# Patient Record
Sex: FEMALE | Race: WHITE | NOT HISPANIC OR LATINO | Employment: FULL TIME | URBAN - METROPOLITAN AREA
[De-identification: names, ages, dates, MRNs, and addresses within clinical notes are randomized per-mention and may not be internally consistent; named-entity substitution may affect disease eponyms.]

---

## 2024-06-26 ENCOUNTER — OFFICE VISIT (OUTPATIENT)
Dept: OBSTETRICS AND GYNECOLOGY | Age: 44
End: 2024-06-26
Payer: COMMERCIAL

## 2024-06-26 VITALS
DIASTOLIC BLOOD PRESSURE: 70 MMHG | SYSTOLIC BLOOD PRESSURE: 128 MMHG | HEIGHT: 64 IN | BODY MASS INDEX: 21.34 KG/M2 | WEIGHT: 125 LBS

## 2024-06-26 DIAGNOSIS — Z13.9 SPECIAL SCREENING: ICD-10-CM

## 2024-06-26 DIAGNOSIS — Z13.89 SCREENING FOR BLOOD OR PROTEIN IN URINE: ICD-10-CM

## 2024-06-26 DIAGNOSIS — Z85.3 HISTORY OF INVASIVE BREAST CANCER: ICD-10-CM

## 2024-06-26 DIAGNOSIS — Z17.0 MALIGNANT NEOPLASM OF CENTRAL PORTION OF RIGHT BREAST IN FEMALE, ESTROGEN RECEPTOR POSITIVE: ICD-10-CM

## 2024-06-26 DIAGNOSIS — D25.0 SUBMUCOUS UTERINE FIBROID: ICD-10-CM

## 2024-06-26 DIAGNOSIS — C50.111 MALIGNANT NEOPLASM OF CENTRAL PORTION OF RIGHT BREAST IN FEMALE, ESTROGEN RECEPTOR POSITIVE: ICD-10-CM

## 2024-06-26 DIAGNOSIS — N93.9 ABNORMAL UTERINE BLEEDING (AUB): Primary | ICD-10-CM

## 2024-06-26 PROBLEM — C50.919 MALIGNANT NEOPLASM OF BREAST: Status: ACTIVE | Noted: 2020-10-28

## 2024-06-26 PROBLEM — D25.1 INTRAMURAL UTERINE FIBROID: Status: ACTIVE | Noted: 2024-06-26

## 2024-06-26 PROBLEM — C50.919 MALIGNANT NEOPLASM OF BREAST: Status: RESOLVED | Noted: 2020-10-28 | Resolved: 2024-06-26

## 2024-06-26 LAB
B-HCG UR QL: NEGATIVE
BILIRUB BLD-MCNC: NEGATIVE MG/DL
CLARITY, POC: CLEAR
COLOR UR: YELLOW
EXPIRATION DATE: NORMAL
GLUCOSE UR STRIP-MCNC: NEGATIVE MG/DL
INTERNAL NEGATIVE CONTROL: NEGATIVE
INTERNAL POSITIVE CONTROL: NORMAL
KETONES UR QL: NEGATIVE
LEUKOCYTE EST, POC: NEGATIVE
Lab: NORMAL
NITRITE UR-MCNC: NEGATIVE MG/ML
PH UR: 5.5 [PH] (ref 5–8)
PROT UR STRIP-MCNC: NEGATIVE MG/DL
RBC # UR STRIP: NEGATIVE /UL
SP GR UR: 1 (ref 1–1.03)
UROBILINOGEN UR QL: NORMAL

## 2024-06-26 PROCEDURE — 81002 URINALYSIS NONAUTO W/O SCOPE: CPT | Performed by: OBSTETRICS & GYNECOLOGY

## 2024-06-26 PROCEDURE — 99203 OFFICE O/P NEW LOW 30 MIN: CPT | Performed by: OBSTETRICS & GYNECOLOGY

## 2024-06-26 PROCEDURE — 81025 URINE PREGNANCY TEST: CPT | Performed by: OBSTETRICS & GYNECOLOGY

## 2024-06-26 RX ORDER — TAMOXIFEN CITRATE 20 MG/1
1 TABLET ORAL DAILY
COMMUNITY
Start: 2024-06-18

## 2024-06-26 NOTE — PROGRESS NOTES
[facility]   Obstetrics and Gynecology     2024      Patient:  Trudy Smith   MR#:[id number]    Office note    Chief Complaint   Patient presents with    Gynecologic Exam     CC: New irregular periods, had U/S today. Pt states she is not due for pap until after 10/10/24       Subjective     History of Present Illness  44 y.o. female  presents with complaint of abnormal uterine bleeding for the past 6 months with unpredictable irregular periods.  Ultrasound was performed today demonstrating a intramural and submucosal fibroid in the 3 cm range.  Endometrial lining is 10 mm and the patient is on tamoxifen for stage I breast cancer.  Endometrial biopsy is performed today.    Options discussed with the patient for management of her abnormal uterine bleedin including hysteroscopy with D&C versus minimally invasive hysterectomy    Relevant data reviewed:  US Non-ob Transvaginal (2024 10:56)      Objective   Patient Active Problem List   Diagnosis    Malignant neoplasm of central portion of right breast in female, estrogen receptor positive    Intramural uterine fibroid    Fibroids, submucosal       History reviewed. No pertinent past medical history.  Past Surgical History:   Procedure Laterality Date    BREAST LUMPECTOMY Right 2020     Obstetric History:  OB History          0    Para   0    Term   0       0    AB   0    Living   0         SAB   0    IAB   0    Ectopic   0    Molar   0    Multiple   0    Live Births   0               Menstrual History:     Patient's last menstrual period was 2024.       The patient has never been pregnant.  Family History   Problem Relation Age of Onset    Breast cancer Mother     Bone cancer Mother     Ovarian cancer Neg Hx     Uterine cancer Neg Hx     Colon cancer Neg Hx      Social History     Tobacco Use    Smoking status: Never     Passive exposure: Never    Smokeless tobacco: Never   Vaping Use    Vaping status: Never Used  "  Substance Use Topics    Alcohol use: Yes     Comment: occ    Drug use: Never     Sulfamethoxazole-trimethoprim    Current Outpatient Medications:     tamoxifen (NOLVADEX) 20 MG chemo tablet, Take 1 tablet by mouth Daily., Disp: , Rfl:     The following portions of the patient's history were reviewed and updated as appropriate: allergies, current medications, past family history, past medical history, past social history, past surgical history, and problem list.    Review of Systems   Constitutional: Negative.    Respiratory: Negative.     Cardiovascular: Negative.    Gastrointestinal: Negative.    Genitourinary:  Positive for menstrual problem and vaginal bleeding.   Psychiatric/Behavioral: Negative.         BP Readings from Last 3 Encounters:   06/26/24 128/70      Wt Readings from Last 3 Encounters:   06/26/24 56.7 kg (125 lb)      BMI: Estimated body mass index is 21.46 kg/m² as calculated from the following:    Height as of this encounter: 162.6 cm (64\").    Weight as of this encounter: 56.7 kg (125 lb). BSA: Estimated body surface area is 1.6 meters squared as calculated from the following:    Height as of this encounter: 162.6 cm (64\").    Weight as of this encounter: 56.7 kg (125 lb).    Physical Exam  Vitals and nursing note reviewed.   Constitutional:       Appearance: Normal appearance. She is well-developed.   HENT:      Head: Normocephalic and atraumatic.   Neck:      Thyroid: No thyromegaly.      Trachea: No tracheal deviation.   Pulmonary:      Effort: Pulmonary effort is normal.   Chest:      Chest wall: No mass.   Breasts:     Right: No mass, nipple discharge or skin change.      Left: No mass, nipple discharge or skin change.   Abdominal:      General: Bowel sounds are normal.      Palpations: Abdomen is soft. There is no mass.      Tenderness: There is no abdominal tenderness. There is no rebound.      Hernia: No hernia is present. There is no hernia in the left inguinal area.   Genitourinary:    "  Labia:         Right: No rash or lesion.         Left: No rash or lesion.       Vagina: Normal.   Musculoskeletal:      Cervical back: Normal range of motion and neck supple.   Skin:     Findings: No rash.   Neurological:      Mental Status: She is alert and oriented to person, place, and time.      Deep Tendon Reflexes: Reflexes are normal and symmetric.   Psychiatric:         Behavior: Behavior normal.         Thought Content: Thought content normal.         Judgment: Judgment normal.            Assessment & Plan   Diagnoses and all orders for this visit:    1. Abnormal uterine bleeding (AUB) (Primary)  -     Case Request    2. Screening for blood or protein in urine  -     POC Urinalysis Dipstick    3. Special screening  -     POC Pregnancy, Urine    4. Submucous uterine fibroid  -     Case Request    5. History of invasive breast cancer  -     Case Request    6. Malignant neoplasm of central portion of right breast in female, estrogen receptor positive    Other orders  -     Cancel: IGP, Apt HPV,rfx 16 / 18,45  -     Cancel: Mammo Screening Digital Tomosynthesis Bilateral With CAD; Future           Options at length and the patient is leaning towards proceeding with total laparoscopic hysterectomy.    Total laparoscopic hysterectomy with bilateral salpingo-oophorectomy     Minimally invasive approaches to hysterectomy are reviewed with the patient.     The surgical procedure is discussed with the patient in detail.  I discussed the risks of the surgical procedure including, but not limited to the risk of bleeding, infection, and damage to internal organs.  In exceedingly rare cases, death has been reported from surgical complications involving hysterectomy.      It is customary with this procedure to remove both fallopian tubes.  Research has suggested that fallopian tubes may be the source of a type of cancer that was previously attributed to the ovaries.  As well, it is usual practice to conserve the ovaries  "outside of significant pathology.     In cases where extensive scar tissue, fibroids, or uncontrolled bleeding is encountered, it may become necessary to convert the procedure to an “open\" laparotomy hysterectomy involving a longer recovery.     The procedure entails very close operative proximity to the bladder and ureters.  There is a risk of injury to these structures.  It is usual practice to inspect the bladder and insure functioning ureters at the conclusion of the procedure using cystoscopy (camera in the bladder).  In exceptionally straightforward cases, selective cystoscopy may be employed to reduce the incidence of iatrogenic urinary tract infection    In addition to routine postoperative instructions provided, all patients are advised that they MUST avoid vaginal penetration for 6-8 weeks postoperative until the upper vaginal cuff is inspected for proper healing.  There is a rare incidence of vaginal cuff separation that can require emergent intervention.          Return in 1 year (on 6/26/2025) for Annual exam.        Mauricio Alvarez MD   6/26/2024 12:35 EDT  "

## 2024-06-28 PROBLEM — N93.9 ABNORMAL UTERINE BLEEDING (AUB): Status: ACTIVE | Noted: 2024-06-26

## 2024-06-28 PROBLEM — D25.0 SUBMUCOUS UTERINE FIBROID: Status: ACTIVE | Noted: 2024-06-26

## 2024-06-28 PROBLEM — Z85.3 HISTORY OF INVASIVE BREAST CANCER: Status: ACTIVE | Noted: 2024-06-26

## 2024-07-01 LAB
PATH REPORT.FINAL DX SPEC: NORMAL
PATH REPORT.GROSS SPEC: NORMAL
PATH REPORT.SITE OF ORIGIN SPEC: NORMAL
PATHOLOGIST NAME: NORMAL
PAYMENT PROCEDURE: NORMAL

## 2024-07-02 ENCOUNTER — TELEPHONE (OUTPATIENT)
Dept: OBSTETRICS AND GYNECOLOGY | Age: 44
End: 2024-07-02
Payer: COMMERCIAL

## 2024-07-02 NOTE — TELEPHONE ENCOUNTER
I was speaking to Flora about her surgery and she asked about her EMBX results. Can you please review and let me know what to tell her?

## 2024-07-08 ENCOUNTER — OFFICE VISIT (OUTPATIENT)
Dept: OBSTETRICS AND GYNECOLOGY | Age: 44
End: 2024-07-08
Payer: COMMERCIAL

## 2024-07-08 DIAGNOSIS — D25.0 FIBROIDS, SUBMUCOSAL: ICD-10-CM

## 2024-07-08 DIAGNOSIS — Z13.9 SPECIAL SCREENING: ICD-10-CM

## 2024-07-08 DIAGNOSIS — N93.9 ABNORMAL UTERINE BLEEDING (AUB): Primary | ICD-10-CM

## 2024-07-08 DIAGNOSIS — D25.1 INTRAMURAL UTERINE FIBROID: ICD-10-CM

## 2024-07-08 DIAGNOSIS — C50.111 MALIGNANT NEOPLASM OF CENTRAL PORTION OF RIGHT BREAST IN FEMALE, ESTROGEN RECEPTOR POSITIVE: ICD-10-CM

## 2024-07-08 DIAGNOSIS — R89.7 ABNORMAL BIOPSY RESULT: ICD-10-CM

## 2024-07-08 DIAGNOSIS — Z17.0 MALIGNANT NEOPLASM OF CENTRAL PORTION OF RIGHT BREAST IN FEMALE, ESTROGEN RECEPTOR POSITIVE: ICD-10-CM

## 2024-07-08 DIAGNOSIS — D25.0 SUBMUCOUS UTERINE FIBROID: ICD-10-CM

## 2024-07-08 LAB
B-HCG UR QL: NEGATIVE
EXPIRATION DATE: NORMAL
INTERNAL NEGATIVE CONTROL: NEGATIVE
INTERNAL POSITIVE CONTROL: NORMAL
Lab: NORMAL

## 2024-07-08 PROCEDURE — 99213 OFFICE O/P EST LOW 20 MIN: CPT | Performed by: OBSTETRICS & GYNECOLOGY

## 2024-07-08 PROCEDURE — 81025 URINE PREGNANCY TEST: CPT | Performed by: OBSTETRICS & GYNECOLOGY

## 2024-07-08 PROCEDURE — 58100 BIOPSY OF UTERUS LINING: CPT | Performed by: OBSTETRICS & GYNECOLOGY

## 2024-07-08 PROCEDURE — 76998 US GUIDE INTRAOP: CPT | Performed by: OBSTETRICS & GYNECOLOGY

## 2024-07-08 NOTE — ADDENDUM NOTE
Addended by: IZABEL GONZALES on: 7/8/2024 12:06 PM     Modules accepted: Orders     Detail Level: Zone Detail Level: Detailed Detail Level: Simple

## 2024-07-08 NOTE — PROGRESS NOTES
[facility]   Obstetrics and Gynecology     2024      Patient:  Trudy Smith   MR#:[id number]    Office note    No chief complaint on file.      Subjective     History of Present Illness  44 y.o. female  returns for follow-up on nondiagnostic endometrial biopsy with previous lining measuring in excess of 5 mm with a history of tamoxifen therapy.    Repeat biopsy performed under ultrasound guidance with certainty of endometrial sampling.  We discussed the possibility exists that there is just no endometrium to sample because of atrophy.    Relevant data reviewed:       Objective   Patient Active Problem List   Diagnosis    Malignant neoplasm of central portion of right breast in female, estrogen receptor positive    Intramural uterine fibroid    Fibroids, submucosal    Abnormal uterine bleeding (AUB)    Submucous uterine fibroid    History of invasive breast cancer       No past medical history on file.  Past Surgical History:   Procedure Laterality Date    BREAST LUMPECTOMY Right 2020     Obstetric History:  OB History          0    Para   0    Term   0       0    AB   0    Living   0         SAB   0    IAB   0    Ectopic   0    Molar   0    Multiple   0    Live Births   0               Menstrual History:     Patient's last menstrual period was 2024.       The patient has never been pregnant.  Family History   Problem Relation Age of Onset    Breast cancer Mother     Bone cancer Mother     Ovarian cancer Neg Hx     Uterine cancer Neg Hx     Colon cancer Neg Hx      Social History     Tobacco Use    Smoking status: Never     Passive exposure: Never    Smokeless tobacco: Never   Vaping Use    Vaping status: Never Used   Substance Use Topics    Alcohol use: Yes     Comment: occ    Drug use: Never     Sulfamethoxazole-trimethoprim    Current Outpatient Medications:     tamoxifen (NOLVADEX) 20 MG chemo tablet, Take 1 tablet by mouth Daily., Disp: , Rfl:     The following  "portions of the patient's history were reviewed and updated as appropriate: allergies, current medications, past family history, past medical history, past social history, past surgical history, and problem list.    Review of Systems   Constitutional: Negative.    Respiratory: Negative.     Cardiovascular: Negative.    Gastrointestinal: Negative.    Genitourinary: Negative.    Psychiatric/Behavioral: Negative.         BP Readings from Last 3 Encounters:   06/26/24 128/70      Wt Readings from Last 3 Encounters:   06/26/24 56.7 kg (125 lb)      BMI: Estimated body mass index is 21.46 kg/m² as calculated from the following:    Height as of 6/26/24: 162.6 cm (64\").    Weight as of 6/26/24: 56.7 kg (125 lb). BSA: Estimated body surface area is 1.6 meters squared as calculated from the following:    Height as of 6/26/24: 162.6 cm (64\").    Weight as of 6/26/24: 56.7 kg (125 lb).    Physical Exam  Constitutional:       General: She is not in acute distress.  Pulmonary:      Effort: Pulmonary effort is normal.   Abdominal:      Palpations: Abdomen is soft.      Tenderness: There is no abdominal tenderness.   Skin:     General: Skin is warm.   Neurological:      Mental Status: She is alert.   Psychiatric:         Mood and Affect: Mood normal.         Thought Content: Thought content normal.         Judgment: Judgment normal.         Endometrial Biopsy Procedure Note - repeat with ultrasound guidance    Pre-operative Diagnosis: Prior non-diagnostic endometrial sampling    Post-operative Diagnosis: same    Indications: abnormal uterine bleeding    Procedure Details    Urine pregnancy test was done and was NEGATIVE .  The risks (including infection, bleeding, pain, and uterine perforation) and benefits of the procedure were explained to the patient and Verbal informed consent was obtained.  Antibiotic prophylaxis against endocarditis was not indicated.     The patient was placed in the dorsal lithotomy position.  Bimanual exam " showed the uterus to be in the neutral position.  A Graves' speculum inserted in the vagina, and the cervix prepped with povidone iodine.  Endocervical curettage with a Kevorkian curette was not performed.     A sharp tenaculum was applied to the anterior lip of the cervix for stabilization.  A sterile uterine sound was used to sound the uterus to a depth of 6.5cm.  A Pipelle endometrial aspirator was used to sample the endometrium.  Sample was sent for pathologic examination.    Three passes were made with the Pipelle with ultrasound confirmation of cavity placement and advancement to the fundus.  Again scant tissue was recovered with the 3 passes    Condition:  Stable    Complications:  None  Patient tolerated the procedure well without complications.    Plan:    The patient was advised to call for any fever or for prolonged or severe pain or bleeding. She was advised to use NSAID as needed for mild to moderate pain. She was advised to avoid vaginal intercourse for 48 hours or until the bleeding has completely stopped.    Attending Physician Documentation:  I was present for the entire procedure.        Assessment & Plan   Diagnoses and all orders for this visit:    1. Abnormal uterine bleeding (AUB) (Primary)    2. Special screening  -     POC Pregnancy, Urine    3. Fibroids, submucosal    4. Intramural uterine fibroid    5. Malignant neoplasm of central portion of right breast in female, estrogen receptor positive    6. Submucous uterine fibroid    7. Abnormal biopsy result - non-diagnostic [R89.7]      Endometrial sampling repeated with ultrasound guidance and thorough biopsy of the complete cavity was observed         No follow-ups on file.        Mauricio Alvarez MD   7/8/2024 11:05 EDT

## 2024-08-14 RX ORDER — SODIUM CHLORIDE 9 MG/ML
40 INJECTION, SOLUTION INTRAVENOUS AS NEEDED
Status: DISCONTINUED | OUTPATIENT
Start: 2024-08-14 | End: 2024-08-29

## 2024-08-20 ENCOUNTER — TELEPHONE (OUTPATIENT)
Dept: OBSTETRICS AND GYNECOLOGY | Age: 44
End: 2024-08-20
Payer: COMMERCIAL

## 2024-08-20 NOTE — TELEPHONE ENCOUNTER
I called pt back and had to leave a message. I left her a detailed message and instructed her to call me with any questions.

## 2024-08-20 NOTE — TELEPHONE ENCOUNTER
Pt called and states that she was given Macrobid for a UTI yesterday. She took it and while at work last night had an allergic reaction. Anaphylaxis  She was taken by ambulance to [facility] and given 2 Epi Pens. She had extensive lab work done at the hospital. PAT spoke to pt and said that she did not need lab work, but wanted her to call us and see if you wanted her to have it drawn on Monday with her PCP. Pt is also taking Tamoxifen and she was instructed to ask you if she needs to stop it prior to surgery or just morning of. All ER records are in her chart. Can you please advise?

## 2024-08-28 NOTE — H&P
[facility]   Obstetrics and Gynecology   History & Physical    2024    Patient: Trudy Smith          MR#:[id number]    Chief complaint:  Abnormal bleeding with Tamoxifen therapy     Subjective     44 y.o. female  with complaint of abnormal uterine bleeding for the past 6 months with unpredictable irregular periods.  Ultrasound was performed today demonstrating a intramural and submucosal fibroid in the 3 cm range.  Endometrial lining is 10 mm and the patient is on tamoxifen for stage I breast cancer.  Endometrial biopsy is performed today.     Options discussed with the patient for management of her abnormal uterine bleedin including hysteroscopy with D&C versus minimally invasive hysterectomy     Relevant data reviewed:  US Non-ob Transvaginal (2024 10:56)   Reference Histopathology (2024 13:25)        Patient Active Problem List   Diagnosis    Malignant neoplasm of central portion of right breast in female, estrogen receptor positive    Intramural uterine fibroid    Fibroids, submucosal    Abnormal uterine bleeding (AUB)    Submucous uterine fibroid    History of invasive breast cancer       Past Medical History:   Diagnosis Date    Fibroid uterus     History of breast cancer         History of radiation therapy     UTI (urinary tract infection)     CURRENTLY BEING TREATED BY PCP 2024       Past Surgical History:   Procedure Laterality Date    BREAST LUMPECTOMY Right 2020    WITH LYMPH NODE    COLONOSCOPY      WISDOM TOOTH EXTRACTION         Obstetric History:  OB History          0    Para   0    Term   0       0    AB   0    Living   0         SAB   0    IAB   0    Ectopic   0    Molar   0    Multiple   0    Live Births   0               Menstrual History:     Patient's last menstrual period was 2024.       The patient has never been pregnant.    Family History   Problem Relation Age of Onset    Breast cancer Mother     Bone cancer  Mother     Ovarian cancer Neg Hx     Uterine cancer Neg Hx     Colon cancer Neg Hx     Malig Hyperthermia Neg Hx        Social History     Tobacco Use    Smoking status: Never     Passive exposure: Never    Smokeless tobacco: Never   Vaping Use    Vaping status: Never Used   Substance Use Topics    Alcohol use: Yes     Comment: occ    Drug use: Never       Nitrofurantoin and Sulfamethoxazole-trimethoprim      Current Facility-Administered Medications:     ceFAZolin 2000 mg IVPB in 100 mL NS (MBP), 2 g, Intravenous, Once, Mauricio Alvarez MD    fentaNYL citrate (PF) (SUBLIMAZE) injection 50 mcg, 50 mcg, Intravenous, Once PRN, Ricardo Ferrell MD    lactated ringers infusion, 9 mL/hr, Intravenous, Continuous, Ricardo Ferrell MD, Last Rate: 9 mL/hr at 08/29/24 0848, 9 mL/hr at 08/29/24 0848    lidocaine (XYLOCAINE) 1 % injection 0.5 mL, 0.5 mL, Intradermal, Once PRN, Ricardo Ferrell MD    midazolam (VERSED) injection 1 mg, 1 mg, Intravenous, Q5 Min PRN, Ricardo Ferrell MD    phenazopyridine (PYRIDIUM) tablet 200 mg, 200 mg, Oral, Once, Mauricio Alvarez MD, 200 mg at 08/29/24 0847    scopolamine patch 1 mg/72 hr, 1 patch, Transdermal, Continuous, Mauricio Alvarez MD, 1 patch at 08/29/24 0848    sodium chloride 0.9 % flush 10 mL, 10 mL, Intravenous, PRN, Mauricio Alvarez MD    sodium chloride 0.9 % flush 3 mL, 3 mL, Intravenous, Q12H, Mauricio Alvarez MD    sodium chloride 0.9 % flush 3 mL, 3 mL, Intravenous, Q12H, Ricardo Ferrell MD    sodium chloride 0.9 % flush 3-10 mL, 3-10 mL, Intravenous, PRN, Ricardo Ferrell MD    sodium chloride 0.9 % infusion 40 mL, 40 mL, Intravenous, PRN, Mauricio Alvarez MD    Review of Systems  Review of Systems   Constitutional: Negative.    Respiratory: Negative.     Cardiovascular: Negative.    Gastrointestinal: Negative.    Genitourinary:  Positive for menstrual problem and vaginal bleeding.   Psychiatric/Behavioral: Negative.         Objective     Vital Signs  Temp:  [98 °F (36.7  "°C)] 98 °F (36.7 °C)  Heart Rate:  [71] 71  Resp:  [18] 18  BP: (115)/(78) 115/78    Physical Exam:  Physical Exam  Vitals and nursing note reviewed.   Constitutional:       Appearance: Normal appearance. She is well-developed.   HENT:      Head: Normocephalic and atraumatic.   Neck:      Thyroid: No thyromegaly.      Trachea: No tracheal deviation.   Pulmonary:      Effort: Pulmonary effort is normal.   Chest:      Chest wall: No mass.   Breasts:     Right: No mass, nipple discharge or skin change.      Left: No mass, nipple discharge or skin change.   Abdominal:      General: Bowel sounds are normal.      Palpations: Abdomen is soft. There is no mass.      Tenderness: There is no abdominal tenderness. There is no rebound.      Hernia: No hernia is present. There is no hernia in the left inguinal area.   Genitourinary:     Labia:         Right: No rash or lesion.         Left: No rash or lesion.       Vagina: Normal.   Musculoskeletal:      Cervical back: Normal range of motion and neck supple.   Skin:     Findings: No rash.   Neurological:      Mental Status: She is alert and oriented to person, place, and time.      Deep Tendon Reflexes: Reflexes are normal and symmetric.   Psychiatric:         Behavior: Behavior normal.         Thought Content: Thought content normal.         Judgment: Judgment normal.         Labs:            Invalid input(s): \"LABALBU\", \"PROT\"      Assessment & Plan       Abnormal uterine bleeding (AUB)    Submucous uterine fibroid    History of invasive breast cancer        Plan:  Total laparoscopic hysterectomy with bilateral salpingo-oophorectomy     Minimally invasive approaches to hysterectomy are reviewed with the patient.     The surgical procedure is discussed with the patient in detail.  I discussed the risks of the surgical procedure including, but not limited to the risk of bleeding, infection, and damage to internal organs.  In exceedingly rare cases, death has been reported from " "surgical complications involving hysterectomy.      It is customary with this procedure to remove both fallopian tubes.  Research has suggested that fallopian tubes may be the source of a type of cancer that was previously attributed to the ovaries.  As well, it is usual practice to conserve the ovaries outside of significant pathology.     In cases where extensive scar tissue, fibroids, or uncontrolled bleeding is encountered, it may become necessary to convert the procedure to an “open\" laparotomy hysterectomy involving a longer recovery.     The procedure entails very close operative proximity to the bladder and ureters.  There is a risk of injury to these structures.  It is usual practice to inspect the bladder and insure functioning ureters at the conclusion of the procedure using cystoscopy (camera in the bladder).  In exceptionally straightforward cases, selective cystoscopy may be employed to reduce the incidence of iatrogenic urinary tract infection    In addition to routine postoperative instructions provided, all patients are advised that they MUST avoid vaginal penetration for 6-8 weeks postoperative until the upper vaginal cuff is inspected for proper healing.  There is a rare incidence of vaginal cuff separation that can require emergent intervention.      Mauricio Alvarez MD  08/29/24  09:15 EDT      Patient Care Team:  Mary Lou Rodriguez PA as PCP - General (Physician Assistant)            "

## 2024-08-29 ENCOUNTER — HOSPITAL ENCOUNTER (OUTPATIENT)
Facility: HOSPITAL | Age: 44
Discharge: HOME | End: 2024-08-29
Attending: OBSTETRICS & GYNECOLOGY | Admitting: OBSTETRICS & GYNECOLOGY
Payer: COMMERCIAL

## 2024-08-29 ENCOUNTER — ANESTHESIA (OUTPATIENT)
Dept: PERIOP | Facility: HOSPITAL | Age: 44
End: 2024-08-29
Payer: COMMERCIAL

## 2024-08-29 ENCOUNTER — ANESTHESIA EVENT (OUTPATIENT)
Dept: PERIOP | Facility: HOSPITAL | Age: 44
End: 2024-08-29
Payer: COMMERCIAL

## 2024-08-29 VITALS
TEMPERATURE: 97.2 F | HEIGHT: 64 IN | SYSTOLIC BLOOD PRESSURE: 103 MMHG | OXYGEN SATURATION: 97 % | DIASTOLIC BLOOD PRESSURE: 68 MMHG | HEART RATE: 72 BPM | BODY MASS INDEX: 21.51 KG/M2 | RESPIRATION RATE: 16 BRPM

## 2024-08-29 DIAGNOSIS — D25.0 SUBMUCOUS UTERINE FIBROID: ICD-10-CM

## 2024-08-29 DIAGNOSIS — Z90.710 S/P LAPAROSCOPIC HYSTERECTOMY: ICD-10-CM

## 2024-08-29 DIAGNOSIS — Z85.3 HISTORY OF INVASIVE BREAST CANCER: ICD-10-CM

## 2024-08-29 DIAGNOSIS — N93.9 ABNORMAL UTERINE BLEEDING (AUB): ICD-10-CM

## 2024-08-29 DIAGNOSIS — Z01.818 PREOP TESTING: Primary | ICD-10-CM

## 2024-08-29 LAB
ABO GROUP BLD: NORMAL
B-HCG UR QL: NEGATIVE
BLD GP AB SCN SERPL QL: NEGATIVE
EXPIRATION DATE: NORMAL
INTERNAL NEGATIVE CONTROL: NORMAL
INTERNAL POSITIVE CONTROL: NORMAL
Lab: NORMAL
RH BLD: POSITIVE
T&S EXPIRATION DATE: NORMAL

## 2024-08-29 PROCEDURE — 25010000002 HYDROMORPHONE 1 MG/ML SOLUTION: Performed by: REGISTERED NURSE

## 2024-08-29 PROCEDURE — 86900 BLOOD TYPING SEROLOGIC ABO: CPT | Performed by: OBSTETRICS & GYNECOLOGY

## 2024-08-29 PROCEDURE — 25010000002 SUGAMMADEX 200 MG/2ML SOLUTION: Performed by: REGISTERED NURSE

## 2024-08-29 PROCEDURE — 25010000002 KETOROLAC TROMETHAMINE PER 15 MG: Performed by: OBSTETRICS & GYNECOLOGY

## 2024-08-29 PROCEDURE — 86850 RBC ANTIBODY SCREEN: CPT | Performed by: OBSTETRICS & GYNECOLOGY

## 2024-08-29 PROCEDURE — 25010000002 DEXAMETHASONE SODIUM PHOSPHATE 20 MG/5ML SOLUTION: Performed by: REGISTERED NURSE

## 2024-08-29 PROCEDURE — 58571 TLH W/T/O 250 G OR LESS: CPT | Performed by: OBSTETRICS & GYNECOLOGY

## 2024-08-29 PROCEDURE — 25810000003 SODIUM CHLORIDE PER 500 ML: Performed by: OBSTETRICS & GYNECOLOGY

## 2024-08-29 PROCEDURE — 25010000002 POTASSIUM CHLORIDE PER 2 MEQ: Performed by: OBSTETRICS & GYNECOLOGY

## 2024-08-29 PROCEDURE — 81025 URINE PREGNANCY TEST: CPT | Performed by: ANESTHESIOLOGY

## 2024-08-29 PROCEDURE — 86901 BLOOD TYPING SEROLOGIC RH(D): CPT | Performed by: OBSTETRICS & GYNECOLOGY

## 2024-08-29 PROCEDURE — 88307 TISSUE EXAM BY PATHOLOGIST: CPT | Performed by: OBSTETRICS & GYNECOLOGY

## 2024-08-29 PROCEDURE — 25010000002 CEFAZOLIN PER 500 MG: Performed by: OBSTETRICS & GYNECOLOGY

## 2024-08-29 PROCEDURE — 25010000002 PROPOFOL 200 MG/20ML EMULSION: Performed by: REGISTERED NURSE

## 2024-08-29 PROCEDURE — 25010000002 ONDANSETRON PER 1 MG: Performed by: REGISTERED NURSE

## 2024-08-29 PROCEDURE — 63710000001 ONDANSETRON ODT 4 MG TABLET DISPERSIBLE: Performed by: OBSTETRICS & GYNECOLOGY

## 2024-08-29 PROCEDURE — 25810000003 LACTATED RINGERS PER 1000 ML: Performed by: ANESTHESIOLOGY

## 2024-08-29 DEVICE — ABSORBABLE RELOAD
Type: IMPLANTABLE DEVICE | Site: VAGINA | Status: FUNCTIONAL
Brand: V-LOC 180

## 2024-08-29 RX ORDER — SCOLOPAMINE TRANSDERMAL SYSTEM 1 MG/1
1 PATCH, EXTENDED RELEASE TRANSDERMAL CONTINUOUS
Status: DISCONTINUED | OUTPATIENT
Start: 2024-08-29 | End: 2024-08-29

## 2024-08-29 RX ORDER — SODIUM CHLORIDE 0.9 % (FLUSH) 0.9 %
3 SYRINGE (ML) INJECTION EVERY 12 HOURS SCHEDULED
Status: DISCONTINUED | OUTPATIENT
Start: 2024-08-29 | End: 2024-08-29 | Stop reason: HOSPADM

## 2024-08-29 RX ORDER — BISACODYL 5 MG/1
10 TABLET, DELAYED RELEASE ORAL DAILY PRN
Status: DISCONTINUED | OUTPATIENT
Start: 2024-08-29 | End: 2024-08-29 | Stop reason: HOSPADM

## 2024-08-29 RX ORDER — HYDROMORPHONE HYDROCHLORIDE 1 MG/ML
0.5 INJECTION, SOLUTION INTRAMUSCULAR; INTRAVENOUS; SUBCUTANEOUS
Status: DISCONTINUED | OUTPATIENT
Start: 2024-08-29 | End: 2024-08-29 | Stop reason: HOSPADM

## 2024-08-29 RX ORDER — IPRATROPIUM BROMIDE AND ALBUTEROL SULFATE 2.5; .5 MG/3ML; MG/3ML
3 SOLUTION RESPIRATORY (INHALATION) ONCE AS NEEDED
Status: DISCONTINUED | OUTPATIENT
Start: 2024-08-29 | End: 2024-08-29 | Stop reason: HOSPADM

## 2024-08-29 RX ORDER — PHENYLEPHRINE HCL IN 0.9% NACL 1 MG/10 ML
SYRINGE (ML) INTRAVENOUS AS NEEDED
Status: DISCONTINUED | OUTPATIENT
Start: 2024-08-29 | End: 2024-08-29 | Stop reason: SURG

## 2024-08-29 RX ORDER — OXYCODONE AND ACETAMINOPHEN 5; 325 MG/1; MG/1
1 TABLET ORAL EVERY 4 HOURS PRN
Status: DISCONTINUED | OUTPATIENT
Start: 2024-08-29 | End: 2024-08-29 | Stop reason: HOSPADM

## 2024-08-29 RX ORDER — HYDRALAZINE HYDROCHLORIDE 20 MG/ML
5 INJECTION INTRAMUSCULAR; INTRAVENOUS
Status: DISCONTINUED | OUTPATIENT
Start: 2024-08-29 | End: 2024-08-29 | Stop reason: HOSPADM

## 2024-08-29 RX ORDER — ONDANSETRON 2 MG/ML
4 INJECTION INTRAMUSCULAR; INTRAVENOUS ONCE AS NEEDED
Status: DISCONTINUED | OUTPATIENT
Start: 2024-08-29 | End: 2024-08-29 | Stop reason: HOSPADM

## 2024-08-29 RX ORDER — BUPIVACAINE HYDROCHLORIDE AND EPINEPHRINE 5; 5 MG/ML; UG/ML
INJECTION, SOLUTION EPIDURAL; INTRACAUDAL; PERINEURAL AS NEEDED
Status: DISCONTINUED | OUTPATIENT
Start: 2024-08-29 | End: 2024-08-29 | Stop reason: HOSPADM

## 2024-08-29 RX ORDER — PROMETHAZINE HYDROCHLORIDE 25 MG/1
25 SUPPOSITORY RECTAL ONCE AS NEEDED
Status: DISCONTINUED | OUTPATIENT
Start: 2024-08-29 | End: 2024-08-29 | Stop reason: HOSPADM

## 2024-08-29 RX ORDER — DEXAMETHASONE SODIUM PHOSPHATE 4 MG/ML
INJECTION, SOLUTION INTRA-ARTICULAR; INTRALESIONAL; INTRAMUSCULAR; INTRAVENOUS; SOFT TISSUE AS NEEDED
Status: DISCONTINUED | OUTPATIENT
Start: 2024-08-29 | End: 2024-08-29 | Stop reason: SURG

## 2024-08-29 RX ORDER — DROPERIDOL 2.5 MG/ML
0.62 INJECTION, SOLUTION INTRAMUSCULAR; INTRAVENOUS
Status: DISCONTINUED | OUTPATIENT
Start: 2024-08-29 | End: 2024-08-29 | Stop reason: HOSPADM

## 2024-08-29 RX ORDER — DIPHENHYDRAMINE HYDROCHLORIDE 50 MG/ML
12.5 INJECTION INTRAMUSCULAR; INTRAVENOUS
Status: DISCONTINUED | OUTPATIENT
Start: 2024-08-29 | End: 2024-08-29 | Stop reason: HOSPADM

## 2024-08-29 RX ORDER — FLUMAZENIL 0.1 MG/ML
0.2 INJECTION INTRAVENOUS AS NEEDED
Status: DISCONTINUED | OUTPATIENT
Start: 2024-08-29 | End: 2024-08-29 | Stop reason: HOSPADM

## 2024-08-29 RX ORDER — ONDANSETRON 2 MG/ML
INJECTION INTRAMUSCULAR; INTRAVENOUS AS NEEDED
Status: DISCONTINUED | OUTPATIENT
Start: 2024-08-29 | End: 2024-08-29 | Stop reason: SURG

## 2024-08-29 RX ORDER — KETOROLAC TROMETHAMINE 15 MG/ML
15 INJECTION, SOLUTION INTRAMUSCULAR; INTRAVENOUS EVERY 6 HOURS
Status: DISCONTINUED | OUTPATIENT
Start: 2024-08-29 | End: 2024-08-29 | Stop reason: HOSPADM

## 2024-08-29 RX ORDER — EPHEDRINE SULFATE 50 MG/ML
5 INJECTION, SOLUTION INTRAVENOUS ONCE AS NEEDED
Status: DISCONTINUED | OUTPATIENT
Start: 2024-08-29 | End: 2024-08-29 | Stop reason: HOSPADM

## 2024-08-29 RX ORDER — SODIUM CHLORIDE 0.9 % (FLUSH) 0.9 %
10 SYRINGE (ML) INJECTION AS NEEDED
Status: DISCONTINUED | OUTPATIENT
Start: 2024-08-29 | End: 2024-08-29 | Stop reason: HOSPADM

## 2024-08-29 RX ORDER — IBUPROFEN 600 MG/1
600 TABLET, FILM COATED ORAL EVERY 6 HOURS PRN
Qty: 30 TABLET | Refills: 1 | Status: SHIPPED | OUTPATIENT
Start: 2024-08-29

## 2024-08-29 RX ORDER — LABETALOL HYDROCHLORIDE 5 MG/ML
5 INJECTION, SOLUTION INTRAVENOUS
Status: DISCONTINUED | OUTPATIENT
Start: 2024-08-29 | End: 2024-08-29 | Stop reason: HOSPADM

## 2024-08-29 RX ORDER — ALUMINA, MAGNESIA, AND SIMETHICONE 2400; 2400; 240 MG/30ML; MG/30ML; MG/30ML
15 SUSPENSION ORAL EVERY 6 HOURS
Status: DISCONTINUED | OUTPATIENT
Start: 2024-08-29 | End: 2024-08-29 | Stop reason: HOSPADM

## 2024-08-29 RX ORDER — LIDOCAINE HYDROCHLORIDE 20 MG/ML
INJECTION, SOLUTION INFILTRATION; PERINEURAL AS NEEDED
Status: DISCONTINUED | OUTPATIENT
Start: 2024-08-29 | End: 2024-08-29 | Stop reason: SURG

## 2024-08-29 RX ORDER — ACETAMINOPHEN 500 MG
1000 TABLET ORAL ONCE
Status: COMPLETED | OUTPATIENT
Start: 2024-08-29 | End: 2024-08-29

## 2024-08-29 RX ORDER — LIDOCAINE HYDROCHLORIDE 10 MG/ML
0.5 INJECTION, SOLUTION INFILTRATION; PERINEURAL ONCE AS NEEDED
Status: DISCONTINUED | OUTPATIENT
Start: 2024-08-29 | End: 2024-08-29 | Stop reason: HOSPADM

## 2024-08-29 RX ORDER — FENTANYL CITRATE 50 UG/ML
50 INJECTION, SOLUTION INTRAMUSCULAR; INTRAVENOUS ONCE AS NEEDED
Status: DISCONTINUED | OUTPATIENT
Start: 2024-08-29 | End: 2024-08-29 | Stop reason: HOSPADM

## 2024-08-29 RX ORDER — HYDROCODONE BITARTRATE AND ACETAMINOPHEN 5; 325 MG/1; MG/1
1 TABLET ORAL EVERY 6 HOURS PRN
Qty: 14 TABLET | Refills: 0 | Status: SHIPPED | OUTPATIENT
Start: 2024-08-29

## 2024-08-29 RX ORDER — BISACODYL 10 MG
10 SUPPOSITORY, RECTAL RECTAL DAILY PRN
Status: DISCONTINUED | OUTPATIENT
Start: 2024-08-29 | End: 2024-08-29 | Stop reason: HOSPADM

## 2024-08-29 RX ORDER — SIMETHICONE 80 MG
80 TABLET,CHEWABLE ORAL 4 TIMES DAILY PRN
Status: DISCONTINUED | OUTPATIENT
Start: 2024-08-29 | End: 2024-08-29 | Stop reason: HOSPADM

## 2024-08-29 RX ORDER — SODIUM CHLORIDE 0.9 % (FLUSH) 0.9 %
3-10 SYRINGE (ML) INJECTION AS NEEDED
Status: DISCONTINUED | OUTPATIENT
Start: 2024-08-29 | End: 2024-08-29 | Stop reason: HOSPADM

## 2024-08-29 RX ORDER — PHENAZOPYRIDINE HYDROCHLORIDE 200 MG/1
200 TABLET, FILM COATED ORAL ONCE
Status: DISCONTINUED | OUTPATIENT
Start: 2024-08-29 | End: 2024-08-29

## 2024-08-29 RX ORDER — ONDANSETRON 2 MG/ML
4 INJECTION INTRAMUSCULAR; INTRAVENOUS EVERY 6 HOURS PRN
Status: DISCONTINUED | OUTPATIENT
Start: 2024-08-29 | End: 2024-08-29 | Stop reason: HOSPADM

## 2024-08-29 RX ORDER — ONDANSETRON 4 MG/1
4 TABLET, ORALLY DISINTEGRATING ORAL EVERY 6 HOURS PRN
Status: DISCONTINUED | OUTPATIENT
Start: 2024-08-29 | End: 2024-08-29 | Stop reason: HOSPADM

## 2024-08-29 RX ORDER — MIDAZOLAM HYDROCHLORIDE 1 MG/ML
1 INJECTION INTRAMUSCULAR; INTRAVENOUS
Status: DISCONTINUED | OUTPATIENT
Start: 2024-08-29 | End: 2024-08-29 | Stop reason: HOSPADM

## 2024-08-29 RX ORDER — FENTANYL CITRATE 50 UG/ML
50 INJECTION, SOLUTION INTRAMUSCULAR; INTRAVENOUS
Status: DISCONTINUED | OUTPATIENT
Start: 2024-08-29 | End: 2024-08-29 | Stop reason: HOSPADM

## 2024-08-29 RX ORDER — ROCURONIUM BROMIDE 10 MG/ML
INJECTION, SOLUTION INTRAVENOUS AS NEEDED
Status: DISCONTINUED | OUTPATIENT
Start: 2024-08-29 | End: 2024-08-29 | Stop reason: SURG

## 2024-08-29 RX ORDER — NALOXONE HCL 0.4 MG/ML
0.1 VIAL (ML) INJECTION
Status: DISCONTINUED | OUTPATIENT
Start: 2024-08-29 | End: 2024-08-29 | Stop reason: HOSPADM

## 2024-08-29 RX ORDER — PROMETHAZINE HYDROCHLORIDE 25 MG/1
25 TABLET ORAL ONCE AS NEEDED
Status: DISCONTINUED | OUTPATIENT
Start: 2024-08-29 | End: 2024-08-29 | Stop reason: HOSPADM

## 2024-08-29 RX ORDER — DOCUSATE SODIUM 100 MG/1
100 CAPSULE, LIQUID FILLED ORAL 2 TIMES DAILY PRN
Status: DISCONTINUED | OUTPATIENT
Start: 2024-08-29 | End: 2024-08-29 | Stop reason: HOSPADM

## 2024-08-29 RX ORDER — OXYCODONE AND ACETAMINOPHEN 7.5; 325 MG/1; MG/1
1 TABLET ORAL EVERY 4 HOURS PRN
Status: DISCONTINUED | OUTPATIENT
Start: 2024-08-29 | End: 2024-08-29 | Stop reason: HOSPADM

## 2024-08-29 RX ORDER — FAMOTIDINE 10 MG/ML
20 INJECTION, SOLUTION INTRAVENOUS ONCE
Status: COMPLETED | OUTPATIENT
Start: 2024-08-29 | End: 2024-08-29

## 2024-08-29 RX ORDER — GABAPENTIN 300 MG/1
600 CAPSULE ORAL ONCE
Status: COMPLETED | OUTPATIENT
Start: 2024-08-29 | End: 2024-08-29

## 2024-08-29 RX ORDER — SODIUM CHLORIDE 9 MG/ML
INJECTION, SOLUTION INTRAVENOUS AS NEEDED
Status: DISCONTINUED | OUTPATIENT
Start: 2024-08-29 | End: 2024-08-29 | Stop reason: HOSPADM

## 2024-08-29 RX ORDER — NALOXONE HCL 0.4 MG/ML
0.2 VIAL (ML) INJECTION AS NEEDED
Status: DISCONTINUED | OUTPATIENT
Start: 2024-08-29 | End: 2024-08-29 | Stop reason: HOSPADM

## 2024-08-29 RX ORDER — HYDROCODONE BITARTRATE AND ACETAMINOPHEN 5; 325 MG/1; MG/1
1 TABLET ORAL ONCE AS NEEDED
Status: DISCONTINUED | OUTPATIENT
Start: 2024-08-29 | End: 2024-08-29 | Stop reason: HOSPADM

## 2024-08-29 RX ORDER — SODIUM CHLORIDE AND POTASSIUM CHLORIDE 150; 450 MG/100ML; MG/100ML
100 INJECTION, SOLUTION INTRAVENOUS CONTINUOUS
Status: DISCONTINUED | OUTPATIENT
Start: 2024-08-29 | End: 2024-08-29 | Stop reason: HOSPADM

## 2024-08-29 RX ORDER — SODIUM CHLORIDE, SODIUM LACTATE, POTASSIUM CHLORIDE, CALCIUM CHLORIDE 600; 310; 30; 20 MG/100ML; MG/100ML; MG/100ML; MG/100ML
9 INJECTION, SOLUTION INTRAVENOUS CONTINUOUS
Status: DISCONTINUED | OUTPATIENT
Start: 2024-08-29 | End: 2024-08-29

## 2024-08-29 RX ORDER — PROPOFOL 10 MG/ML
INJECTION, EMULSION INTRAVENOUS AS NEEDED
Status: DISCONTINUED | OUTPATIENT
Start: 2024-08-29 | End: 2024-08-29 | Stop reason: SURG

## 2024-08-29 RX ADMIN — POTASSIUM CHLORIDE AND SODIUM CHLORIDE 100 ML/HR: 450; 150 INJECTION, SOLUTION INTRAVENOUS at 15:25

## 2024-08-29 RX ADMIN — ONDANSETRON 4 MG: 2 INJECTION INTRAMUSCULAR; INTRAVENOUS at 10:06

## 2024-08-29 RX ADMIN — SCOPALAMINE 1 PATCH: 1 PATCH, EXTENDED RELEASE TRANSDERMAL at 08:48

## 2024-08-29 RX ADMIN — KETOROLAC TROMETHAMINE 15 MG: 15 INJECTION, SOLUTION INTRAMUSCULAR; INTRAVENOUS at 13:57

## 2024-08-29 RX ADMIN — LIDOCAINE HYDROCHLORIDE 60 MG: 20 INJECTION, SOLUTION INFILTRATION; PERINEURAL at 10:03

## 2024-08-29 RX ADMIN — GABAPENTIN 600 MG: 300 CAPSULE ORAL at 08:48

## 2024-08-29 RX ADMIN — ACETAMINOPHEN 1000 MG: 500 TABLET ORAL at 08:48

## 2024-08-29 RX ADMIN — Medication 100 MCG: at 10:16

## 2024-08-29 RX ADMIN — DEXAMETHASONE SODIUM PHOSPHATE 6 MG: 4 INJECTION, SOLUTION INTRAMUSCULAR; INTRAVENOUS at 10:06

## 2024-08-29 RX ADMIN — SODIUM CHLORIDE, POTASSIUM CHLORIDE, SODIUM LACTATE AND CALCIUM CHLORIDE 9 ML/HR: 600; 310; 30; 20 INJECTION, SOLUTION INTRAVENOUS at 08:48

## 2024-08-29 RX ADMIN — Medication 100 MCG: at 11:05

## 2024-08-29 RX ADMIN — FAMOTIDINE 20 MG: 10 INJECTION INTRAVENOUS at 08:47

## 2024-08-29 RX ADMIN — ONDANSETRON 4 MG: 4 TABLET, ORALLY DISINTEGRATING ORAL at 18:06

## 2024-08-29 RX ADMIN — Medication 100 MCG: at 11:10

## 2024-08-29 RX ADMIN — SODIUM CHLORIDE 2 G: 900 INJECTION INTRAVENOUS at 09:49

## 2024-08-29 RX ADMIN — PHENAZOPYRIDINE 200 MG: 200 TABLET ORAL at 08:47

## 2024-08-29 RX ADMIN — OXYCODONE HYDROCHLORIDE AND ACETAMINOPHEN 1 TABLET: 5; 325 TABLET ORAL at 18:06

## 2024-08-29 RX ADMIN — ROCURONIUM BROMIDE 50 MG: 10 INJECTION, SOLUTION INTRAVENOUS at 10:05

## 2024-08-29 RX ADMIN — SUGAMMADEX 100 MG: 100 INJECTION, SOLUTION INTRAVENOUS at 11:10

## 2024-08-29 RX ADMIN — ALUMINUM HYDROXIDE, MAGNESIUM HYDROXIDE, DIMETHICONE 15 ML: 400; 400; 40 SUSPENSION ORAL at 13:57

## 2024-08-29 RX ADMIN — HYDROMORPHONE HYDROCHLORIDE 0.5 MG: 1 INJECTION, SOLUTION INTRAMUSCULAR; INTRAVENOUS; SUBCUTANEOUS at 10:02

## 2024-08-29 RX ADMIN — PROPOFOL 100 MG: 10 INJECTION, EMULSION INTRAVENOUS at 10:03

## 2024-08-29 RX ADMIN — DOCUSATE SODIUM 100 MG: 100 CAPSULE, LIQUID FILLED ORAL at 18:06

## 2024-08-29 NOTE — ANESTHESIA POSTPROCEDURE EVALUATION
Patient: Trudy Smith    Procedure Summary       Date: 08/29/24 Room / Location: [facility] OR [address] [place] [address] MAIN OR    Anesthesia Start: 0954 Anesthesia Stop: 1129    Procedure: TOTAL LAPAROSCOPIC HYSTERECTOMY BILATERAL SALPINGO OOPHORECTOMY, CYSTOSCOPY (Abdomen) Diagnosis:       Abnormal uterine bleeding (AUB)      Submucous uterine fibroid      History of invasive breast cancer      (Abnormal uterine bleeding (AUB) [N93.9])      (Submucous uterine fibroid [D25.0])      (History of invasive breast cancer [Z85.3])    Surgeons: Mauricio Alvarez MD Provider: Ricardo Ferrell MD    Anesthesia Type: general ASA Status: 2            Anesthesia Type: general    Vitals  Vitals Value Taken Time   BP 99/71 08/29/24 1215   Temp 36.6 °C (97.8 °F) 08/29/24 1127   Pulse 68 08/29/24 1221   Resp 16 08/29/24 1215   SpO2 100 % 08/29/24 1221   Vitals shown include unfiled device data.        Post Anesthesia Care and Evaluation    Patient location during evaluation: PACU  Patient participation: complete - patient participated  Level of consciousness: awake and alert  Pain management: adequate    Airway patency: patent  Anesthetic complications: No anesthetic complications  PONV Status: none  Cardiovascular status: acceptable and hemodynamically stable  Respiratory status: acceptable, nonlabored ventilation and spontaneous ventilation  Hydration status: acceptable    
First Trimester Sonogram

## 2024-08-29 NOTE — ANESTHESIA PROCEDURE NOTES
Airway  Urgency: elective    Date/Time: 8/29/2024 10:07 AM  Airway not difficult    General Information and Staff    Patient location during procedure: OR  Anesthesiologist: Ricardo Ferrell MD  CRNA/CAA: Samir Simmons CRNA    Indications and Patient Condition  Indications for airway management: airway protection    Preoxygenated: yes  MILS not maintained throughout  Mask difficulty assessment: 1 - vent by mask    Final Airway Details  Final airway type: endotracheal airway      Successful airway: ETT  Cuffed: yes   Successful intubation technique: direct laryngoscopy  Endotracheal tube insertion site: oral  Blade: Dee  Blade size: 3  ETT size (mm): 7.0  Cormack-Lehane Classification: grade I - full view of glottis  Placement verified by: chest auscultation and capnometry   Cuff volume (mL): 10  Measured from: teeth  ETT/EBT  to teeth (cm): 22  Number of attempts at approach: 1  Assessment: lips, teeth, and gum same as pre-op and atraumatic intubation

## 2024-08-29 NOTE — PLAN OF CARE
Goal Outcome Evaluation:  Plan of Care Reviewed With: patient        Progress: improving  Outcome Evaluation: From PACU this afternoon. VSS on room air. Walked in halls. IVFs infusing. Patient tolerated crackers and jello. No complaints of pain. Scheduled toradol given. Due to void. Family at bedside. Patient eager to go home. Lap sites x3 with steri strips intact and eliane.

## 2024-08-29 NOTE — NURSING NOTE
Discharge instructions provided. Rx sent to her pharmacy. Minimal pain, denies nausea, voiding well, and tolerating diet. She has a follow up scheduled with Dr. Alvarez. No questions/concerns at this time.

## 2024-08-29 NOTE — ANESTHESIA PREPROCEDURE EVALUATION
Anesthesia Evaluation     Patient summary reviewed and Nursing notes reviewed   NPO Solid Status: > 8 hours  NPO Liquid Status: > 2 hours           Airway   Mallampati: II  TM distance: >3 FB  Neck ROM: full  No difficulty expected  Dental - normal exam     Pulmonary - normal exam    breath sounds clear to auscultation  Cardiovascular - normal exam    Rhythm: regular  Rate: normal        Neuro/Psych  GI/Hepatic/Renal/Endo      Musculoskeletal     Abdominal  - normal exam   Substance History      OB/GYN      Comment: AUB/fibroid      Other   blood dyscrasia anemia,   history of cancer      Other Comment: Hx breast CA/Hgb 11.4              Anesthesia Plan    ASA 2     general     intravenous induction     Anesthetic plan, risks, benefits, and alternatives have been provided, discussed and informed consent has been obtained with: patient.    CODE STATUS:    Code Status (Patient has no pulse and is not breathing): CPR (Attempt to Resuscitate)  Medical Interventions (Patient has pulse or is breathing): Full Support

## 2024-08-29 NOTE — OP NOTE
[facility]   Obstetrics and Gynecology   Operative Note    Date of Procedure: 8/29/2024    Patient:  Trudy Smith   MR#: [id number]    PREOPERATIVE DIAGNOSIS:   Abnormal uterine bleeding (AUB) [N93.9]  Submucous uterine fibroid [D25.0]  History of invasive breast cancer [Z85.3]    Post-Op Diagnosis Codes:     * Abnormal uterine bleeding (AUB) [N93.9]     * Submucous uterine fibroid [D25.0]     * History of invasive breast cancer [Z85.3]      PROCEDURES PERFORMED:    1. Total laparoscopic hysterectomy.    2. Bilateral salpingo-oophorectomy   3. Cystoscopy.      SURGEON: Mauricio Alvarez MD    ASSISTANT:  Shania Suazo played a vital role in the success of this case with assistance in the following: Retraction, Suction, Irrigation, Suturing, Closing, and holding the camera     ANESTHESIA: General.      ESTIMATED BLOOD LOSS: 10 mL.      SPECIMENS:  ID Type Source Tests Collected by Time   A (Not marked as sent) : UTERUS, CERVIX, BILATERAL FALLOPIAN TUBES AND OVARIES Tissue Uterus with Cervix, Bilateral Tubes and Ovaries TISSUE PATHOLOGY EXAM Mauricio Alvarez MD 8/29/2024 1036         COMPLICATIONS: None.      INDICATIONS: See the written history and physical.      DESCRIPTION OF PROCEDURE: The patient was taken to the operating room and placed in supine position. General anesthesia was administered.  The surgical time-out is completed for the procedure.  The patient's legs were positioned in Karan stirrups and her arms were wrapped in protective foam padding and tucked at her side. She was prepped and draped in the usual sterile fashion.     Attention was turned vaginally. A weighted speculum was inserted. The cervix was grasped anteriorly with a single-tooth tenaculum. Anchoring stitches were placed in the cervix anteriorly and posteriorly with 0 Vicryl. The cervix was dilated to 12-Georgian. Uterus sounds to 6-8 cm. The ROBBIE device was then inserted into the uterus using the anchoring  stitches threaded through the cup to seat the cervix within the ROBBIE cup. The intrauterine balloon was inflated and attention was turned abdominally.     A small vertical infraumbilical skin incision was made with the knife. The Veress needle was placed through the incision. The abdomen is insufflated with CO2 gas with normal pressures noted while insufflating. When an adequate pneumoperitoneum was achieved, a 5 mm bladeless trocar was inserted through the umbilical incision and the scope immediately thereafter. An initial survey of the abdomen provides no evidence of pathology with mild adhesive disease. The patient was placed in Trendelenburg. Accessory ports are then inserted under direct visualization after appropriate skin incisions were made in the left and right lower quadrant, 5 and 10 mm. The incision sites are made after transilluminating the abdomen to avoid the epigastric vessels.  Both trocars are atraumatic and inserted under direct visualization. Bowel was manipulated cephalad to expose the pelvis.  Bilaterally, the infundibulopelvic ligament was clamped cauterized and transected in serial bites    The round ligament is clamped, cauterized, and transected in serial bites down to a level just above the uterine vessels on the left. The anterior reflection of the peritoneum was then opened and incised with the Harmonic scalpel to create a bladder flap. The peritoneal incision is connected with the contralateral side and then the bladder is dissected inferiorly and the dissection of the bladder flap was completed to expose the ring of the ROBBIE device. The uterine vessels on the right were then clamped, cauterized, and transected using vascular mode of the Harmonic scalpel in serial bites. Identical procedure is repeated on the contralateral side.     Anterior colpotomy was performed after the vaginal occluder balloon was inflated. The colpotomy is carried out circumferentially with the active blade of the  Harmonic scalpel on the upper region of the ROBBIE ring. When the transsection is completed, the specimen is then delivered vaginally without difficulty. The operative site is copiously irrigated, inspected, and noted to be hemostatic.  The vagina is occluded with an asepto-bulb to maintain the pneumoperitoneum.  The cuff was then closed with 2-0 V-Loc suture using the Ethicon Endo Stitch device. The cuff is closed posterior to anterior toward the surgeon in a running fashion. Upon reaching the proximal lateral  margin, the stitch is threaded back 2 bites to anchor the leading stitch. Careful attention is made during the closure to avoid the bladder flap with the placement of the anterior stitch and the colon posteriorly. The cuff is noted to be hemostatic upon closure.     Attention is turned vaginally. The Cortez catheter is removed. The bladder is instilled with approximately 200 mL of sterile water. The 5 mm laparoscope is inserted. The bladder dome appears healthy with no evidence of injury. No pathology is observed. Both ureters are identified and noted to flux urine stained with Pyridium given preoperatively. Cortez catheter was replaced. Cuff is reinspected and noted to remain hemostatic as are the pelvic sidewall pedicles. The abdomen was deflated. Valsalva is given by anesthesia to displace all remaining air. All ports are removed. Skin incisions are closed with 4-0 Vicryl in a subcuticular fashion. The sites are injected with 0.5% Marcaine. The patient is awakened and taken to the recovery room in stable condition.      SUMMARY: Uncomplicated total laparoscopic hysterectomy with bilateral salpingo-oophorectomy.         Mauricio Alvarez MD  8/29/2024  11:13 EDT

## 2024-08-30 ENCOUNTER — TELEPHONE (OUTPATIENT)
Dept: OBSTETRICS AND GYNECOLOGY | Age: 44
End: 2024-08-30

## 2024-08-30 LAB
CYTO UR: NORMAL
LAB AP CASE REPORT: NORMAL
PATH REPORT.FINAL DX SPEC: NORMAL
PATH REPORT.GROSS SPEC: NORMAL

## 2024-08-30 NOTE — PROGRESS NOTES
Case Management Discharge Note      Final Note: Discharged home. Nydia Huizar RN         Selected Continued Care - Discharged on 8/29/2024 Admission date: 8/29/2024 - Discharge disposition: Home or Self Care         Transportation Services  Private: Car    Final Discharge Disposition Code: 01 - home or self-care

## 2024-08-30 NOTE — TELEPHONE ENCOUNTER
Caller: [facility] SHORT TERM    Relationship: SHORT TERM DISABILITY CLAIM    Best call back number: [phone]    What is the best time to reach you: CALL ANYTIME, IT IS OKAY TO LEAVE DETAILED VOICEMAIL. PLEASE PROVIDE PATIENT NAME AND  ON VOICEMAIL.    Who are you requesting to speak with (clinical staff, provider,  specific staff member): FIRST AVAILABLE.     REHAN WITH [employer] IS REQUESTING A CALL BACK TO CONFIRM HYSTERECTOMY WAS PERFORMED 24 AND WAS OUT PATIENT.

## 2024-09-11 ENCOUNTER — OFFICE VISIT (OUTPATIENT)
Dept: OBSTETRICS AND GYNECOLOGY | Age: 44
End: 2024-09-11
Payer: COMMERCIAL

## 2024-09-11 VITALS
WEIGHT: 125 LBS | DIASTOLIC BLOOD PRESSURE: 70 MMHG | HEIGHT: 64 IN | BODY MASS INDEX: 21.34 KG/M2 | SYSTOLIC BLOOD PRESSURE: 122 MMHG

## 2024-09-11 DIAGNOSIS — Z13.89 SCREENING FOR BLOOD OR PROTEIN IN URINE: ICD-10-CM

## 2024-09-11 DIAGNOSIS — Z90.710 S/P LAPAROSCOPIC HYSTERECTOMY: ICD-10-CM

## 2024-09-11 DIAGNOSIS — Z09 POSTOP CHECK: Primary | ICD-10-CM

## 2024-09-11 LAB
BILIRUB BLD-MCNC: NEGATIVE MG/DL
CLARITY, POC: CLEAR
COLOR UR: YELLOW
GLUCOSE UR STRIP-MCNC: NEGATIVE MG/DL
KETONES UR QL: NEGATIVE
LEUKOCYTE EST, POC: NEGATIVE
NITRITE UR-MCNC: NEGATIVE MG/ML
PH UR: 6.5 [PH] (ref 5–8)
PROT UR STRIP-MCNC: ABNORMAL MG/DL
RBC # UR STRIP: NEGATIVE /UL
SP GR UR: 1.02 (ref 1–1.03)
UROBILINOGEN UR QL: ABNORMAL

## 2024-09-11 PROCEDURE — 99024 POSTOP FOLLOW-UP VISIT: CPT | Performed by: OBSTETRICS & GYNECOLOGY

## 2024-09-11 PROCEDURE — 81002 URINALYSIS NONAUTO W/O SCOPE: CPT | Performed by: OBSTETRICS & GYNECOLOGY

## 2024-09-11 NOTE — PROGRESS NOTES
[facility]   Obstetrics and Gynecology   Postop Visit    2024    Patient: Trudy Smith          MR#:[id number]    History of Present Illness    Chief Complaint   Patient presents with    Gynecologic Exam     CC: 2 wk PO TLH       44 y.o. female  status post uncomplicated total laparoscopic hysterectomy with bilateral salpingo-oophorectomy.      The patient presents feeling well requesting return to work Monday    Relevant data reviewed:  Tissue Pathology Exam (2024 10:36)      ________________________________________  Patient Active Problem List   Diagnosis    Malignant neoplasm of central portion of right breast in female, estrogen receptor positive    Intramural uterine fibroid    Fibroids, submucosal    Abnormal uterine bleeding (AUB)    Submucous uterine fibroid    History of invasive breast cancer    S/P laparoscopic hysterectomy     Past Medical History:   Diagnosis Date    Fibroid uterus     History of breast cancer         History of radiation therapy     UTI (urinary tract infection)     CURRENTLY BEING TREATED BY PCP 2024     Past Surgical History:   Procedure Laterality Date    BREAST LUMPECTOMY Right 2020    WITH LYMPH NODE    COLONOSCOPY      TOTAL LAPAROSCOPIC HYSTERECTOMY SALPINGO OOPHORECTOMY N/A 2024    Procedure: TOTAL LAPAROSCOPIC HYSTERECTOMY BILATERAL SALPINGO OOPHORECTOMY, CYSTOSCOPY;  Surgeon: Mauricio Alvarez MD;  Location: [facility];  Service: Obstetrics/Gynecology;  Laterality: N/A;    WISDOM TOOTH EXTRACTION       Social History     Tobacco Use   Smoking Status Never    Passive exposure: Never   Smokeless Tobacco Never     has a current medication list which includes the following prescription(s): multiple vitamin and tamoxifen.  ______________________________________  Review of Systems   Constitutional: Negative.    Respiratory: Negative.     Cardiovascular: Negative.    Gastrointestinal: Negative.    Genitourinary: Negative.   "  Psychiatric/Behavioral: Negative.              Objective   /70   Ht 162.6 cm (64\")   Wt 56.7 kg (125 lb)   LMP 08/07/2024   BMI 21.46 kg/m²    BP Readings from Last 3 Encounters:   09/11/24 122/70   08/29/24 103/68   08/20/24 109/75      Wt Readings from Last 3 Encounters:   09/11/24 56.7 kg (125 lb)   08/20/24 56.8 kg (125 lb 4.8 oz)   06/26/24 56.7 kg (125 lb)      BMI: Estimated body mass index is 21.46 kg/m² as calculated from the following:    Height as of this encounter: 162.6 cm (64\").    Weight as of this encounter: 56.7 kg (125 lb).  Physical Exam  Constitutional:       General: She is not in acute distress.  Pulmonary:      Effort: Pulmonary effort is normal.   Abdominal:      Palpations: Abdomen is soft.      Tenderness: There is no abdominal tenderness.      Comments: Incisions are intact and healing well without signs of infection   Skin:     General: Skin is warm.   Neurological:      Mental Status: She is alert.   Psychiatric:         Mood and Affect: Mood normal.         Thought Content: Thought content normal.         Judgment: Judgment normal.         Assessment:  Diagnoses and all orders for this visit:    1. Postop check (Primary)    2. Screening for blood or protein in urine  -     POC Urinalysis Dipstick    3. S/P laparoscopic hysterectomy        Plan:   No follow-ups on file.      Mauricio Alvarez MD  9/11/2024 08:47 EDT  "

## 2024-10-07 ENCOUNTER — OFFICE VISIT (OUTPATIENT)
Dept: OBSTETRICS AND GYNECOLOGY | Age: 44
End: 2024-10-07
Payer: COMMERCIAL

## 2024-10-07 VITALS
DIASTOLIC BLOOD PRESSURE: 70 MMHG | SYSTOLIC BLOOD PRESSURE: 120 MMHG | WEIGHT: 124 LBS | BODY MASS INDEX: 21.17 KG/M2 | HEIGHT: 64 IN

## 2024-10-07 DIAGNOSIS — Z09 POSTOP CHECK: ICD-10-CM

## 2024-10-07 DIAGNOSIS — Z90.710 S/P LAPAROSCOPIC HYSTERECTOMY: Primary | ICD-10-CM

## 2024-10-07 PROBLEM — Z85.3 HISTORY OF INVASIVE BREAST CANCER: Status: RESOLVED | Noted: 2024-06-26 | Resolved: 2024-10-07

## 2024-10-07 PROBLEM — D25.0 SUBMUCOUS UTERINE FIBROID: Status: RESOLVED | Noted: 2024-06-26 | Resolved: 2024-10-07

## 2024-10-07 PROBLEM — D25.0 FIBROIDS, SUBMUCOSAL: Status: RESOLVED | Noted: 2024-06-26 | Resolved: 2024-10-07

## 2024-10-07 PROBLEM — D25.1 INTRAMURAL UTERINE FIBROID: Status: RESOLVED | Noted: 2024-06-26 | Resolved: 2024-10-07

## 2024-10-07 PROBLEM — N93.9 ABNORMAL UTERINE BLEEDING (AUB): Status: RESOLVED | Noted: 2024-06-26 | Resolved: 2024-10-07

## 2024-10-07 PROCEDURE — 99024 POSTOP FOLLOW-UP VISIT: CPT | Performed by: OBSTETRICS & GYNECOLOGY

## 2024-10-07 NOTE — PROGRESS NOTES
"[facility]   Obstetrics and Gynecology   Postop Visit    10/7/2024    Patient: Trudy Smith          MR#:[id number]    History of Present Illness    Chief Complaint   Patient presents with    Gynecologic Exam     CC: Cuff check       44 y.o. female  status post uncomplicated total laparoscopic hysterectomy who presents for 6-week vaginal cuff check.    Patient has had an excellent recovery.      ________________________________________  Patient Active Problem List   Diagnosis    Malignant neoplasm of central portion of right breast in female, estrogen receptor positive    S/P laparoscopic hysterectomy     Past Medical History:   Diagnosis Date    Fibroid uterus     History of breast cancer         History of radiation therapy     UTI (urinary tract infection)     CURRENTLY BEING TREATED BY PCP 2024     Past Surgical History:   Procedure Laterality Date    BREAST LUMPECTOMY Right 2020    WITH LYMPH NODE    COLONOSCOPY      TOTAL LAPAROSCOPIC HYSTERECTOMY SALPINGO OOPHORECTOMY N/A 2024    Procedure: TOTAL LAPAROSCOPIC HYSTERECTOMY BILATERAL SALPINGO OOPHORECTOMY, CYSTOSCOPY;  Surgeon: Mauricio Alvarez MD;  Location: [facility];  Service: Obstetrics/Gynecology;  Laterality: N/A;    WISDOM TOOTH EXTRACTION       Social History     Tobacco Use   Smoking Status Never    Passive exposure: Never   Smokeless Tobacco Never     has a current medication list which includes the following prescription(s): multiple vitamin and tamoxifen.  ______________________________________  Review of Systems   Constitutional: Negative.    Respiratory: Negative.     Cardiovascular: Negative.    Gastrointestinal: Negative.    Genitourinary: Negative.    Psychiatric/Behavioral: Negative.              Objective   /70   Ht 162.6 cm (64\")   Wt 56.2 kg (124 lb)   LMP 2024   BMI 21.28 kg/m²    BP Readings from Last 3 Encounters:   10/07/24 120/70   24 122/70   24 103/68      Wt " "Readings from Last 3 Encounters:   10/07/24 56.2 kg (124 lb)   09/11/24 56.7 kg (125 lb)   08/20/24 56.8 kg (125 lb 4.8 oz)      BMI: Estimated body mass index is 21.28 kg/m² as calculated from the following:    Height as of this encounter: 162.6 cm (64\").    Weight as of this encounter: 56.2 kg (124 lb).  Physical Exam  Constitutional:       General: She is not in acute distress.     Appearance: Normal appearance.   Abdominal:      General: Abdomen is flat.      Palpations: Abdomen is soft.   Genitourinary:     Comments: Incisions are dry clean and intact without evidence of any infection    Neurological:      Mental Status: She is alert.         Assessment:  Diagnoses and all orders for this visit:    1. S/P laparoscopic hysterectomy (Primary)    2. Postop check        Plan:   No follow-ups on file.      Mauricio Alvarez MD  10/7/2024 08:36 EDT  "

## (undated) DEVICE — ENDOCUT SCISSOR TIP, DISPOSABLE: Brand: RENEW

## (undated) DEVICE — LOU GYN LAPAROSCOPY: Brand: MEDLINE INDUSTRIES, INC.

## (undated) DEVICE — TROCAR: Brand: KII OPTICAL ACCESS SYSTEM

## (undated) DEVICE — TROCAR: Brand: KII SLEEVE

## (undated) DEVICE — TOTAL TRAY, 16FR 10ML SIL FOLEY, URN: Brand: MEDLINE

## (undated) DEVICE — SYRINGE, LUER LOCK, 60ML: Brand: MEDLINE

## (undated) DEVICE — MANIP UTER RUMI 2 KOH EFFICIENT SS CP 3.5CM

## (undated) DEVICE — ENDOPATH XCEL BLADELESS TROCARS WITH STABILITY SLEEVES: Brand: ENDOPATH XCEL

## (undated) DEVICE — GLV SURG SIGNATURE ESSENTIAL PF LTX SZ7

## (undated) DEVICE — LAPAROSCOPIC DISSECTOR: Brand: DEROYAL

## (undated) DEVICE — SUTURING DEVICE: Brand: ENDO STITCH

## (undated) DEVICE — DEV COND GAS LAP INSUFLOW W/LUER CONN

## (undated) DEVICE — SYR LUERLOK 5CC

## (undated) DEVICE — 3M™ STERI-DRAPE™ INSTRUMENT POUCH 1018L: Brand: STERI-DRAPE™

## (undated) DEVICE — LAPAROVUE VISIBILITY SYSTEM LAPAROSCOPIC SOLUTIONS: Brand: LAPAROVUE

## (undated) DEVICE — COVER,MAYO STAND,STERILE: Brand: MEDLINE

## (undated) DEVICE — HARMONIC 700 SHEARS, ADVANCED HEMOSTASIS: Brand: HARMONIC

## (undated) DEVICE — GLV SURG BIOGEL LTX PF 7 1/2

## (undated) DEVICE — APPL CHLORAPREP HI/LITE 26ML ORNG

## (undated) DEVICE — MANIP UTER RUMI TP 5.1MM 6CM LAV

## (undated) DEVICE — SUT MNCRYL PLS ANTIB UD 4/0 PS2 18IN

## (undated) DEVICE — SPNG GZ WOVN 4X4IN 12PLY 10/BX STRL

## (undated) DEVICE — ENDOPATH PNEUMONEEDLE INSUFFLATION NEEDLES WITH LUER LOCK CONNECTORS 120MM: Brand: ENDOPATH

## (undated) DEVICE — SOL NACL 0.9PCT 1000ML

## (undated) DEVICE — IRRIGATOR BULB ASEPTO 60CC STRL

## (undated) DEVICE — ANTIBACTERIAL UNDYED BRAIDED (POLYGLACTIN 910), SYNTHETIC ABSORBABLE SUTURE: Brand: COATED VICRYL